# Patient Record
Sex: FEMALE | Race: BLACK OR AFRICAN AMERICAN | NOT HISPANIC OR LATINO | Employment: OTHER | URBAN - METROPOLITAN AREA
[De-identification: names, ages, dates, MRNs, and addresses within clinical notes are randomized per-mention and may not be internally consistent; named-entity substitution may affect disease eponyms.]

---

## 2017-01-31 PROBLEM — N18.4 CKD (CHRONIC KIDNEY DISEASE), STAGE IV: Status: ACTIVE | Noted: 2017-01-31

## 2017-11-20 DIAGNOSIS — M76.822 POSTERIOR TIBIAL TENDINITIS OF LEFT LEG: ICD-10-CM

## 2017-11-20 DIAGNOSIS — M67.88 BILATERAL ACHILLES TENDONOSIS: Primary | ICD-10-CM

## 2018-06-12 ENCOUNTER — HOSPITAL ENCOUNTER (EMERGENCY)
Facility: HOSPITAL | Age: 78
Discharge: HOME | End: 2018-06-12
Attending: EMERGENCY MEDICINE
Payer: COMMERCIAL

## 2018-06-12 VITALS
BODY MASS INDEX: 23.39 KG/M2 | HEART RATE: 48 BPM | DIASTOLIC BLOOD PRESSURE: 78 MMHG | TEMPERATURE: 97 F | OXYGEN SATURATION: 100 % | SYSTOLIC BLOOD PRESSURE: 188 MMHG | WEIGHT: 137 LBS | HEIGHT: 64 IN | RESPIRATION RATE: 19 BRPM

## 2018-06-12 DIAGNOSIS — R07.89 CHEST PAIN, NON-CARDIAC: Primary | ICD-10-CM

## 2018-06-12 DIAGNOSIS — R07.9 CHEST PAIN: ICD-10-CM

## 2018-06-12 LAB
ALBUMIN SERPL BCP-MCNC: 3.4 G/DL
ALP SERPL-CCNC: 63 U/L
ALT SERPL W/O P-5'-P-CCNC: 33 U/L
ANION GAP SERPL CALC-SCNC: 8 MMOL/L
APTT BLDCRRT: 26.4 SEC
AST SERPL-CCNC: 31 U/L
BASOPHILS # BLD AUTO: 0.01 K/UL
BASOPHILS NFR BLD: 0.1 %
BILIRUB SERPL-MCNC: 0.3 MG/DL
BNP SERPL-MCNC: 159 PG/ML
BUN SERPL-MCNC: 19 MG/DL
CALCIUM SERPL-MCNC: 9.6 MG/DL
CHLORIDE SERPL-SCNC: 104 MMOL/L
CO2 SERPL-SCNC: 29 MMOL/L
CREAT SERPL-MCNC: 1.3 MG/DL
DIFFERENTIAL METHOD: ABNORMAL
EOSINOPHIL # BLD AUTO: 0 K/UL
EOSINOPHIL NFR BLD: 0.4 %
ERYTHROCYTE [DISTWIDTH] IN BLOOD BY AUTOMATED COUNT: 14.9 %
EST. GFR  (AFRICAN AMERICAN): 46 ML/MIN/1.73 M^2
EST. GFR  (NON AFRICAN AMERICAN): 40 ML/MIN/1.73 M^2
GLUCOSE SERPL-MCNC: 87 MG/DL
HCT VFR BLD AUTO: 37.9 %
HGB BLD-MCNC: 12.5 G/DL
LYMPHOCYTES # BLD AUTO: 2.1 K/UL
LYMPHOCYTES NFR BLD: 31.1 %
MAGNESIUM SERPL-MCNC: 2.4 MG/DL
MCH RBC QN AUTO: 29.1 PG
MCHC RBC AUTO-ENTMCNC: 33 G/DL
MCV RBC AUTO: 88 FL
MONOCYTES # BLD AUTO: 0.4 K/UL
MONOCYTES NFR BLD: 5.7 %
NEUTROPHILS # BLD AUTO: 4.2 K/UL
NEUTROPHILS NFR BLD: 62.7 %
PLATELET # BLD AUTO: 146 K/UL
PMV BLD AUTO: 10.4 FL
POTASSIUM SERPL-SCNC: 4.8 MMOL/L
PROT SERPL-MCNC: 6.7 G/DL
RBC # BLD AUTO: 4.3 M/UL
SODIUM SERPL-SCNC: 141 MMOL/L
TROPONIN I SERPL DL<=0.01 NG/ML-MCNC: 0.03 NG/ML
TROPONIN I SERPL DL<=0.01 NG/ML-MCNC: 0.04 NG/ML
WBC # BLD AUTO: 6.72 K/UL

## 2018-06-12 PROCEDURE — 63600175 PHARM REV CODE 636 W HCPCS: Performed by: EMERGENCY MEDICINE

## 2018-06-12 PROCEDURE — 85025 COMPLETE CBC W/AUTO DIFF WBC: CPT

## 2018-06-12 PROCEDURE — 84484 ASSAY OF TROPONIN QUANT: CPT

## 2018-06-12 PROCEDURE — 80053 COMPREHEN METABOLIC PANEL: CPT

## 2018-06-12 PROCEDURE — 96375 TX/PRO/DX INJ NEW DRUG ADDON: CPT

## 2018-06-12 PROCEDURE — 93005 ELECTROCARDIOGRAM TRACING: CPT

## 2018-06-12 PROCEDURE — 96374 THER/PROPH/DIAG INJ IV PUSH: CPT

## 2018-06-12 PROCEDURE — 83735 ASSAY OF MAGNESIUM: CPT

## 2018-06-12 PROCEDURE — 93010 ELECTROCARDIOGRAM REPORT: CPT | Mod: ,,, | Performed by: INTERNAL MEDICINE

## 2018-06-12 PROCEDURE — 25000003 PHARM REV CODE 250: Performed by: EMERGENCY MEDICINE

## 2018-06-12 PROCEDURE — 85730 THROMBOPLASTIN TIME PARTIAL: CPT

## 2018-06-12 PROCEDURE — 96376 TX/PRO/DX INJ SAME DRUG ADON: CPT

## 2018-06-12 PROCEDURE — 99285 EMERGENCY DEPT VISIT HI MDM: CPT | Mod: 25

## 2018-06-12 PROCEDURE — 83880 ASSAY OF NATRIURETIC PEPTIDE: CPT

## 2018-06-12 PROCEDURE — 96372 THER/PROPH/DIAG INJ SC/IM: CPT

## 2018-06-12 RX ORDER — MORPHINE SULFATE 10 MG/ML
3 INJECTION INTRAMUSCULAR; INTRAVENOUS; SUBCUTANEOUS
Status: COMPLETED | OUTPATIENT
Start: 2018-06-12 | End: 2018-06-12

## 2018-06-12 RX ORDER — ONDANSETRON 2 MG/ML
8 INJECTION INTRAMUSCULAR; INTRAVENOUS
Status: COMPLETED | OUTPATIENT
Start: 2018-06-12 | End: 2018-06-12

## 2018-06-12 RX ORDER — HYDRALAZINE HYDROCHLORIDE 20 MG/ML
10 INJECTION INTRAMUSCULAR; INTRAVENOUS
Status: COMPLETED | OUTPATIENT
Start: 2018-06-12 | End: 2018-06-12

## 2018-06-12 RX ORDER — HYDROCODONE BITARTRATE AND ACETAMINOPHEN 5; 325 MG/1; MG/1
1 TABLET ORAL EVERY 4 HOURS PRN
Qty: 15 TABLET | Refills: 0 | Status: SHIPPED | OUTPATIENT
Start: 2018-06-12 | End: 2018-06-22

## 2018-06-12 RX ORDER — PRAVASTATIN SODIUM 40 MG/1
40 TABLET ORAL DAILY
COMMUNITY

## 2018-06-12 RX ORDER — BETAMETHASONE SODIUM PHOSPHATE AND BETAMETHASONE ACETATE 3; 3 MG/ML; MG/ML
12 INJECTION, SUSPENSION INTRA-ARTICULAR; INTRALESIONAL; INTRAMUSCULAR; SOFT TISSUE ONCE
Status: COMPLETED | OUTPATIENT
Start: 2018-06-12 | End: 2018-06-12

## 2018-06-12 RX ORDER — PANTOPRAZOLE SODIUM 40 MG/1
40 TABLET, DELAYED RELEASE ORAL 2 TIMES DAILY
Qty: 30 TABLET | Refills: 0 | Status: SHIPPED | OUTPATIENT
Start: 2018-06-12 | End: 2018-07-02

## 2018-06-12 RX ORDER — FUROSEMIDE 10 MG/ML
40 INJECTION INTRAMUSCULAR; INTRAVENOUS
Status: COMPLETED | OUTPATIENT
Start: 2018-06-12 | End: 2018-06-12

## 2018-06-12 RX ORDER — LISINOPRIL 20 MG/1
40 TABLET ORAL
Status: COMPLETED | OUTPATIENT
Start: 2018-06-12 | End: 2018-06-12

## 2018-06-12 RX ORDER — SPIRONOLACTONE 25 MG/1
25 TABLET ORAL
Status: COMPLETED | OUTPATIENT
Start: 2018-06-12 | End: 2018-06-12

## 2018-06-12 RX ORDER — NITROGLYCERIN 0.4 MG/1
0.4 TABLET SUBLINGUAL
Status: COMPLETED | OUTPATIENT
Start: 2018-06-12 | End: 2018-06-12

## 2018-06-12 RX ORDER — MAG HYDROX/ALUMINUM HYD/SIMETH 200-200-20
60 SUSPENSION, ORAL (FINAL DOSE FORM) ORAL
Status: COMPLETED | OUTPATIENT
Start: 2018-06-12 | End: 2018-06-12

## 2018-06-12 RX ORDER — PANTOPRAZOLE SODIUM 40 MG/1
80 TABLET, DELAYED RELEASE ORAL
Status: COMPLETED | OUTPATIENT
Start: 2018-06-12 | End: 2018-06-12

## 2018-06-12 RX ADMIN — ONDANSETRON 8 MG: 2 INJECTION INTRAMUSCULAR; INTRAVENOUS at 07:06

## 2018-06-12 RX ADMIN — ALUMINUM HYDROXIDE, MAGNESIUM HYDROXIDE, AND SIMETHICONE 60 ML: 200; 200; 20 SUSPENSION ORAL at 07:06

## 2018-06-12 RX ADMIN — PANTOPRAZOLE SODIUM 80 MG: 40 TABLET, DELAYED RELEASE ORAL at 07:06

## 2018-06-12 RX ADMIN — FUROSEMIDE 40 MG: 10 INJECTION, SOLUTION INTRAMUSCULAR; INTRAVENOUS at 12:06

## 2018-06-12 RX ADMIN — SPIRONOLACTONE 25 MG: 25 TABLET ORAL at 12:06

## 2018-06-12 RX ADMIN — HYDRALAZINE HYDROCHLORIDE 10 MG: 20 INJECTION INTRAMUSCULAR; INTRAVENOUS at 12:06

## 2018-06-12 RX ADMIN — MORPHINE SULFATE 3 MG: 10 INJECTION INTRAVENOUS at 07:06

## 2018-06-12 RX ADMIN — BETAMETHASONE SODIUM PHOSPHATE AND BETAMETHASONE ACETATE 12 MG: 3; 3 INJECTION, SUSPENSION INTRA-ARTICULAR; INTRALESIONAL; INTRAMUSCULAR at 11:06

## 2018-06-12 RX ADMIN — LISINOPRIL 40 MG: 20 TABLET ORAL at 12:06

## 2018-06-12 RX ADMIN — MORPHINE SULFATE 3 MG: 10 INJECTION INTRAVENOUS at 09:06

## 2018-06-12 RX ADMIN — NITROGLYCERIN 0.4 MG: 0.4 TABLET SUBLINGUAL at 07:06

## 2018-06-12 NOTE — ED NOTES
Tish Orosco [phone] Daughter Daughter left to run home to feed her Dad and will be back within the hour. Phone # in computer in case anyone needs to call her

## 2018-06-12 NOTE — ED TRIAGE NOTES
Pt arrived via ems with c/o chest pain that is reproducible to palpation and inspiration; pt states she has mid sternal cp that radiates to bilateral shoulders; pt states this began this morning when she woke up; pt denies n/v, fever, and chills at this time

## 2018-06-12 NOTE — DISCHARGE INSTRUCTIONS
Hydrocodone for pain.  Pantoprazole as directed.  Please return immediately if you get worse or if new problems develop.  Please follow-up with your primary care doctor in 3 days.  Rest.

## 2018-06-12 NOTE — ED PROVIDER NOTES
"Encounter Date: 6/12/2018    SCRIBE #1 NOTE: I, AlannahNicole Freddy, am scribing for, and in the presence of,  Darrell Lopez MD. I have scribed the following portions of the note - Other sections scribed: HPI, ROS.       History     Chief Complaint   Patient presents with    Chest Pain     Pt reports chest pain x 2 hours     CC: Chest Pain    79 y/o female with Anticoagulant long-term use,  Atrial fibrillation, Cardiac arrest with resuscitation, CHF, gout, HTN, hyperlipidemia, and TIA presents to the ED via EMS for emergent evaluation of mid sternal chest pain that started at 5:30AM today. The patient reports a "pulling" pain that is moderate (5/10). Inspiration and palpation exacerbate the pain. The patient is also c/o frontal HA, mild epigastric, and chronic R sided knee pain. The patient states that her knee pain is due to arthritis. The patient denies smoking cigarettes or drinking EtOH. The patient denies cough, SOB, dysuria, chills, fever, sore throat, or otalgia. No attempted treatment reported. No other symptoms reported.      The history is provided by the patient. No  was used.     Review of patient's allergies indicates:   Allergen Reactions    Pcn [penicillins]      Past Medical History:   Diagnosis Date    Anticoagulant long-term use     Atrial fibrillation     Cardiac arrest     CHF (congestive heart failure)     Gout, joint     Hyperlipidemia     Hypertension     TIA (transient ischemic attack)      Past Surgical History:   Procedure Laterality Date    CARDIAC DEFIBRILLATOR PLACEMENT      to upper left chest wall    HYSTERECTOMY      partial     Family History   Problem Relation Age of Onset    Stroke Mother     Heart disease Mother     Cancer Father     Heart disease Sister      Social History   Substance Use Topics    Smoking status: Never Smoker    Smokeless tobacco: Never Used    Alcohol use No     Review of Systems   Constitutional: Negative for chills and " fever.   HENT: Negative for congestion, ear pain, rhinorrhea and sore throat.    Eyes: Negative for redness.   Respiratory: Negative for cough and shortness of breath.    Cardiovascular: Positive for chest pain (mid sternal).   Gastrointestinal: Positive for abdominal pain (mild epigastric). Negative for diarrhea, nausea and vomiting.   Genitourinary: Negative for decreased urine volume, difficulty urinating, dysuria, frequency, hematuria and urgency.   Musculoskeletal: Negative for back pain and neck pain.        (+) chronic R sided knee pain   Skin: Negative for rash.   Neurological: Positive for headaches (frontal).       Physical Exam     Initial Vitals [06/12/18 0715]   BP Pulse Resp Temp SpO2   (!) 159/77 66 18 97.9 °F (36.6 °C) 98 %      MAP       --         Physical Exam  The patient was examined specifically for the following:   General:No significant distress, Good color, Warm and dry. Head and neck:Scalp atraumatic, Neck supple. Neurological:Appropriate conversation, Gross motor deficits. Eyes:Conjugate gaze, Clear corneas. ENT: No epistaxis. Cardiac: Regular rate and rhythm, Grossly normal heart tones. Pulmonary: Wheezing, Rales. Gastrointestinal: Abdominal tenderness, Abdominal distention. Musculoskeletal: Extremity deformity, Apparent pain with range of motion of the joints. Skin: Rash.   The findings on examination were normal except for the following:  The patient has tenderness at the right upper sternal border.  Palpation there reproduces the pain of the chief complaint.  Vital signs are stable.  ED Course   Procedures  Labs Reviewed   B-TYPE NATRIURETIC PEPTIDE - Abnormal; Notable for the following:        Result Value     (*)     All other components within normal limits   TROPONIN I - Abnormal; Notable for the following:     Troponin I 0.044 (*)     All other components within normal limits   COMPREHENSIVE METABOLIC PANEL - Abnormal; Notable for the following:     Albumin 3.4 (*)     eGFR  if  46 (*)     eGFR if non  40 (*)     All other components within normal limits   CBC W/ AUTO DIFFERENTIAL - Abnormal; Notable for the following:     RDW 14.9 (*)     Platelets 146 (*)     All other components within normal limits   TROPONIN I - Abnormal; Notable for the following:     Troponin I 0.033 (*)     All other components within normal limits   MAGNESIUM   APTT     EKG Readings: (Independently Interpreted)   This patient is in a sinus bradycardia.  There are nonspecific ST segment and T-wave changes.  There is no definite evidence of acute myocardial infarction or malignant arrhythmia.       X-Ray Chest AP Portable   Final Result      No acute radiographic findings on single view of the chest.         Electronically signed by: Mervin Grady MD   Date:    06/12/2018   Time:    08:11        Medical decision making:  This patient presents to the emergency room with chest pain that began at 5:30 a.m..  Pain is a dull heaviness but it is sharper worse with deep breathing.  Patient has some mild frontal head pain.  She has some chronic right knee pain. She has had TIAs, cardiac arrest atrial fibrillation she has a defibrillator.  She has a history of congestive heart failure, systolic.  Because of this patient's past medical history are consult Cardiology.  I discussed the case with , who feels that given the patient's recent catheterization that was negative the patient does not require admission.  I was troubled by the troponin elevation I repeated it the 2nd troponin was lower.  We doubt myocardial infarction.  This chest pain is sharper worse with deep breathing.  The same time the patient is not tachycardic or short of breath. I doubt pulmonary embolus.  I will discharge this patient outpatient evaluation and treatment.  She is maintained on Eliquis.  The mediastinum looks normal I doubt aortic dissection.  I will discharge this patient to follow up.  I will treat  her for pain. I will treat her with steroids for chest wall inflammation                Scribe Attestation:   Scribe #1: I performed the above scribed service and the documentation accurately describes the services I performed. I attest to the accuracy of the note.    Attending Attestation:           Physician Attestation for Scribe:  Physician Attestation Statement for Scribe #1: I, Darrell Lopez MD, reviewed documentation, as scribed by Sanju Hayes in my presence, and it is both accurate and complete.                    Clinical Impression:   The primary encounter diagnosis was Chest pain, non-cardiac. A diagnosis of Chest pain was also pertinent to this visit.                             Darrell Storey MD  06/12/18 6301